# Patient Record
Sex: FEMALE | Race: BLACK OR AFRICAN AMERICAN | URBAN - METROPOLITAN AREA
[De-identification: names, ages, dates, MRNs, and addresses within clinical notes are randomized per-mention and may not be internally consistent; named-entity substitution may affect disease eponyms.]

---

## 2023-06-28 ENCOUNTER — TELEPHONE ENCOUNTER (OUTPATIENT)
Dept: URBAN - METROPOLITAN AREA CLINIC 92 | Facility: CLINIC | Age: 38
End: 2023-06-28

## 2023-06-29 ENCOUNTER — OFFICE VISIT (OUTPATIENT)
Dept: URBAN - METROPOLITAN AREA CLINIC 86 | Facility: CLINIC | Age: 38
End: 2023-06-29
Payer: COMMERCIAL

## 2023-06-29 ENCOUNTER — LAB OUTSIDE AN ENCOUNTER (OUTPATIENT)
Dept: URBAN - METROPOLITAN AREA CLINIC 86 | Facility: CLINIC | Age: 38
End: 2023-06-29

## 2023-06-29 ENCOUNTER — WEB ENCOUNTER (OUTPATIENT)
Dept: URBAN - METROPOLITAN AREA CLINIC 86 | Facility: CLINIC | Age: 38
End: 2023-06-29

## 2023-06-29 VITALS
SYSTOLIC BLOOD PRESSURE: 129 MMHG | DIASTOLIC BLOOD PRESSURE: 87 MMHG | WEIGHT: 186 LBS | HEART RATE: 96 BPM | HEIGHT: 66 IN | BODY MASS INDEX: 29.89 KG/M2 | TEMPERATURE: 96.4 F

## 2023-06-29 DIAGNOSIS — Z71.85 VACCINE COUNSELING: ICD-10-CM

## 2023-06-29 DIAGNOSIS — L71.9 ROSACEA: ICD-10-CM

## 2023-06-29 DIAGNOSIS — Z78.9 ALCOHOL USE: ICD-10-CM

## 2023-06-29 DIAGNOSIS — R76.9 ABNORMAL IMMUNOLOGICAL FINDING IN SERUM: ICD-10-CM

## 2023-06-29 DIAGNOSIS — R74.8 ELEVATED LIVER ENZYMES: ICD-10-CM

## 2023-06-29 PROBLEM — 707724006: Status: ACTIVE | Noted: 2023-06-29

## 2023-06-29 PROBLEM — 443913008: Status: ACTIVE | Noted: 2023-06-29

## 2023-06-29 PROBLEM — 151271000119102: Status: ACTIVE | Noted: 2023-06-29

## 2023-06-29 PROBLEM — 219006: Status: ACTIVE | Noted: 2023-06-29

## 2023-06-29 PROBLEM — 398909004: Status: ACTIVE | Noted: 2023-06-29

## 2023-06-29 PROCEDURE — 99204 OFFICE O/P NEW MOD 45 MIN: CPT | Performed by: PHYSICIAN ASSISTANT

## 2023-06-29 RX ORDER — SYRINGE, DISPOSABLE, 1 ML
USE WITH EVERY-TAKE ONE CAPSULE EVERY %1 HOURS TO MIX MENOPUR SYRINGE, EMPTY DISPOSABLE MISCELLANEOUS
Qty: 1 EACH | Refills: 0 | Status: ON HOLD | COMMUNITY

## 2023-06-29 RX ORDER — CLOMIPHENE CITRATE 50 MG/1
TAKE TWO TABLETS BY MOUTH ONE TIME DAILY FOR 5 DAYS TABLET ORAL
Qty: 10 UNSPECIFIED | Refills: 0 | Status: ON HOLD | COMMUNITY

## 2023-06-29 RX ORDER — FLUOCINONIDE 0.5 MG/G
APPLY TO AFFECTED AREAS EVERY EVENING OINTMENT TOPICAL
Qty: 60 UNSPECIFIED | Refills: 0 | Status: ON HOLD | COMMUNITY

## 2023-06-29 RX ORDER — CHORIOGONADOTROPIN ALFA 250 UG/.5ML
INJECT 0.5 ML SUBCUTANEOUS AT 10PM ON NIGHT ADVISED INJECTION, SOLUTION SUBCUTANEOUS
Qty: 0.5 MILLILITER | Refills: 0 | Status: ON HOLD | COMMUNITY

## 2023-06-29 RX ORDER — NEEDLES, DISPOSABLE 25GX5/8"
USE TO INJECT MENOPUR NEEDLE, DISPOSABLE MISCELLANEOUS
Qty: 1 EACH | Refills: 0 | Status: ON HOLD | COMMUNITY

## 2023-06-29 RX ORDER — DOXYCYCLINE HYCLATE 100 MG/1
TAKE ONE CAPSULE BY MOUTH TWICE A DAY FOR 5 DAYS STARTING THE DAY BEFORE HSG CAPSULE ORAL
Qty: 10 UNSPECIFIED | Refills: 0 | Status: ON HOLD | COMMUNITY

## 2023-06-29 RX ORDER — ESTRADIOL 0.1 MG/D
PLACE ONE PATCH ON THE SKIN EVERY OTHER DAY. STARTING ON CYCLE DAY 2 PATCH TRANSDERMAL
Qty: 8 UNSPECIFIED | Refills: 0 | Status: ACTIVE | COMMUNITY

## 2023-06-29 RX ORDER — FLUCONAZOLE 150 MG/1
TAKE ONE TABLET BY MOUTGH IMMEDIATELY . TAKE THE SECOND TABLET AFTER ANTIBIOTIC TREATMENT TABLET ORAL
Qty: 2 UNSPECIFIED | Refills: 0 | Status: ON HOLD | COMMUNITY

## 2023-06-29 RX ORDER — KETOCONAZOLE 20 MG/G
APPLY TO THE AFFECTED AREA(S) TO FACE TOPICALLY EVERY MORNING CREAM TOPICAL
Qty: 60 UNSPECIFIED | Refills: 2 | Status: ON HOLD | COMMUNITY

## 2023-06-29 RX ORDER — DEXTROAMPHETAMINE SACCHARATE, AMPHETAMINE ASPARTATE MONOHYDRATE, DEXTROAMPHETAMINE SULFATE AND AMPHETAMINE SULFATE 7.5; 7.5; 7.5; 7.5 MG/1; MG/1; MG/1; MG/1
TAKE ONE CAPSULE BY MOUTH EVERY MORNING CAPSULE, EXTENDED RELEASE ORAL
Qty: 30 UNSPECIFIED | Refills: 0 | Status: ACTIVE | COMMUNITY

## 2023-06-29 NOTE — HPI-TODAY'S VISIT:
37-year-old female referred by Dr. Luciana Mackay for evaluation of elevated liver enzymes.  A copy of the note will be sent to the referring provider.  Past medical history includes discoid lupus, rosacea.  Anti-CCP negative.  HANSEL positive in a speckled pattern.  C3 121, C4 21.  White blood cells 2.6, hemoglobin 19.8, red blood cells 5.68.  Total protein 8.5, , , alkaline phosphatase 110, bilirubin 1.29.  Rheumatoid factor negative, positive Sjogren's SS-a antibody at 5.9.  ESR 17.  She is taking adderrall and estradiol patch  she was taking doxycyline but none in 30 days. She was taking a MVI but stopped 1 week ago.  Denies herbals teas, supplements, tumeric, sea gaona etc.  ETOH she drinks 1 bottle of wine a day. 1.5 LIters abotu 7 days a week. She has been doing that x 6 months. Before that she was doing 2 bottles for abouta year.

## 2023-07-08 LAB
A/G RATIO: 1.5
ACTIN (SMOOTH MUSCLE) ANTIBODY: 4
ALBUMIN: 4.8
ALKALINE PHOSPHATASE: 85
ALPHA-1-ANTITRYPSIN, SERUM: 171
ALT (SGPT): 66
AST (SGOT): 90
BASO (ABSOLUTE): 0
BASOS: 0
BILIRUBIN, TOTAL: 0.6
BUN/CREATININE RATIO: 14
BUN: 10
CALCIUM: 9.8
CARBON DIOXIDE, TOTAL: 18
CHLORIDE: 99
CREATININE: 0.73
EGFR: 109
EOS (ABSOLUTE): 0
EOS: 1
FERRITIN, SERUM: 524
GLOBULIN, TOTAL: 3.1
GLUCOSE: 71
HBSAG SCREEN: NEGATIVE
HCV AB: NON REACTIVE
HEMATOCRIT: 49.2
HEMATOLOGY COMMENTS:: (no result)
HEMOGLOBIN: 17.6
HEP A AB, TOTAL: POSITIVE
HEP B CORE AB, TOT: NEGATIVE
HEPATITIS B SURF AB QUANT: 268.1
IMMATURE CELLS: (no result)
IMMATURE GRANS (ABS): 0
IMMATURE GRANULOCYTES: 0
IMMUNOGLOBULIN A, QN, SERUM: 411
IMMUNOGLOBULIN E, TOTAL: 1336
IMMUNOGLOBULIN G, QN, SERUM: 1578
IMMUNOGLOBULIN M, QN, SERUM: 55
INTERPRETATION:: (no result)
IRON BIND.CAP.(TIBC): 371
IRON SATURATION: 42
IRON: 156
LYMPHS (ABSOLUTE): 1.5
LYMPHS: 46
MCH: 34.3
MCHC: 35.8
MCV: 96
MITOCHONDRIAL (M2) ANTIBODY: <20
MONOCYTES(ABSOLUTE): 0.4
MONOCYTES: 14
NEUTROPHILS (ABSOLUTE): 1.2
NEUTROPHILS: 39
NRBC: (no result)
PHENOTYPE (PI): (no result)
PLATELETS: 174
POTASSIUM: 4.8
PROTEIN, TOTAL: 7.9
RBC: 5.13
RDW: 12.1
SODIUM: 137
UIBC: 215
WBC: 3.2

## 2023-07-11 ENCOUNTER — TELEPHONE ENCOUNTER (OUTPATIENT)
Dept: URBAN - METROPOLITAN AREA CLINIC 86 | Facility: CLINIC | Age: 38
End: 2023-07-11

## 2023-07-11 PROBLEM — 365799007: Status: ACTIVE | Noted: 2023-07-11

## 2023-07-11 NOTE — HPI-TODAY'S VISIT:
Dear Roberto Medina,  The June 29 labs were sent to me.  The immunoglobulin G 1578 and this is normal.  IgA slightly elevated at 411.  IgM normal at 55.  And the IgA is also slightly elevated at 1336.  The iron studies were normal.  The ferritin is elevated at 524.  This is a measurement of the iron storage and I will have our office send you another lab to check for a genetic condition called hemochromatosis.  This can also be elevated during inflammation.  The hepatitis B surface immunity was seen at 268.  ASMA for and this is negative.  AMA negative.  Alpha 1 antitrypsin level 171 and the phenotype is MM.  The glucose 71, creatinine 0.73, bilirubin 0.6, alkaline phosphatase 85, AST 90, ALT 66.  Goal for the AST and ALT is less than 25 and as you start to live a healthier lifestyle we can follow this.  White blood cells 3.2, and this is slightly low and I recommend sharing this with the primary care.  Hemoglobin 17.6 and this is elevated and again I am going to have our office send you a lab that you can do locally to check on this.  The hepatitis A immunity was seen.  There is no evidence of hepatitis C.  While the immunoglobulins are elevated I do not feel this is an autoimmune issue causing your liver enzymes to be elevated and likely just due to inflammation given the autoimmune markers asma, ama were negative. We will follow the labs.  We will see what the imaging shows.  Yamila Fischer PA-C

## 2023-07-13 ENCOUNTER — LAB OUTSIDE AN ENCOUNTER (OUTPATIENT)
Dept: URBAN - METROPOLITAN AREA CLINIC 86 | Facility: CLINIC | Age: 38
End: 2023-07-13

## 2023-08-08 ENCOUNTER — DASHBOARD ENCOUNTERS (OUTPATIENT)
Age: 38
End: 2023-08-08

## 2023-08-10 ENCOUNTER — LAB OUTSIDE AN ENCOUNTER (OUTPATIENT)
Dept: URBAN - METROPOLITAN AREA CLINIC 86 | Facility: CLINIC | Age: 38
End: 2023-08-10

## 2023-08-15 ENCOUNTER — OFFICE VISIT (OUTPATIENT)
Dept: URBAN - METROPOLITAN AREA CLINIC 86 | Facility: CLINIC | Age: 38
End: 2023-08-15

## 2023-08-15 RX ORDER — FLUCONAZOLE 150 MG/1
TAKE ONE TABLET BY MOUTGH IMMEDIATELY . TAKE THE SECOND TABLET AFTER ANTIBIOTIC TREATMENT TABLET ORAL
Qty: 2 UNSPECIFIED | Refills: 0 | COMMUNITY

## 2023-08-15 RX ORDER — KETOCONAZOLE 20 MG/G
APPLY TO THE AFFECTED AREA(S) TO FACE TOPICALLY EVERY MORNING CREAM TOPICAL
Qty: 60 UNSPECIFIED | Refills: 2 | COMMUNITY

## 2023-08-15 RX ORDER — CLOMIPHENE CITRATE 50 MG/1
TAKE TWO TABLETS BY MOUTH ONE TIME DAILY FOR 5 DAYS TABLET ORAL
Qty: 10 UNSPECIFIED | Refills: 0 | COMMUNITY

## 2023-08-15 RX ORDER — FLUOCINONIDE 0.5 MG/G
APPLY TO AFFECTED AREAS EVERY EVENING OINTMENT TOPICAL
Qty: 60 UNSPECIFIED | Refills: 0 | COMMUNITY

## 2023-08-15 RX ORDER — CHORIOGONADOTROPIN ALFA 250 UG/.5ML
INJECT 0.5 ML SUBCUTANEOUS AT 10PM ON NIGHT ADVISED INJECTION, SOLUTION SUBCUTANEOUS
Qty: 0.5 MILLILITER | Refills: 0 | COMMUNITY

## 2023-08-15 RX ORDER — ESTRADIOL 0.1 MG/D
PLACE ONE PATCH ON THE SKIN EVERY OTHER DAY. STARTING ON CYCLE DAY 2 PATCH TRANSDERMAL
Qty: 8 UNSPECIFIED | Refills: 0 | Status: ACTIVE | COMMUNITY

## 2023-08-15 RX ORDER — DEXTROAMPHETAMINE SACCHARATE, AMPHETAMINE ASPARTATE MONOHYDRATE, DEXTROAMPHETAMINE SULFATE AND AMPHETAMINE SULFATE 7.5; 7.5; 7.5; 7.5 MG/1; MG/1; MG/1; MG/1
TAKE ONE CAPSULE BY MOUTH EVERY MORNING CAPSULE, EXTENDED RELEASE ORAL
Qty: 30 UNSPECIFIED | Refills: 0 | Status: ACTIVE | COMMUNITY

## 2023-08-15 RX ORDER — DOXYCYCLINE HYCLATE 100 MG/1
TAKE ONE CAPSULE BY MOUTH TWICE A DAY FOR 5 DAYS STARTING THE DAY BEFORE HSG CAPSULE ORAL
Qty: 10 UNSPECIFIED | Refills: 0 | COMMUNITY

## 2023-08-15 RX ORDER — NEEDLES, DISPOSABLE 25GX5/8"
USE TO INJECT MENOPUR NEEDLE, DISPOSABLE MISCELLANEOUS
Qty: 1 EACH | Refills: 0 | COMMUNITY

## 2023-08-15 RX ORDER — SYRINGE, DISPOSABLE, 1 ML
USE WITH EVERY-TAKE ONE CAPSULE EVERY %1 HOURS TO MIX MENOPUR SYRINGE, EMPTY DISPOSABLE MISCELLANEOUS
Qty: 1 EACH | Refills: 0 | COMMUNITY

## 2023-08-17 ENCOUNTER — OFFICE VISIT (OUTPATIENT)
Dept: URBAN - METROPOLITAN AREA CLINIC 16 | Facility: CLINIC | Age: 38
End: 2023-08-17